# Patient Record
(demographics unavailable — no encounter records)

---

## 2017-07-31 NOTE — EMERGENCY DEPARTMENT REPORT
Entered by BRYAN GIRON, acting as scribe for MARIE MARKHAM NP.


Chief Complaint: Chest Pain


Stated Complaint: CHEST PAIN/NUMBNESS


Time Seen by Provider: 07/31/17 16:11





- HPI


History of Present Illness: 





51 y/o male with high cholesterol, presents with 3/10 left sided chest pain 

that started yesterday and has improved since onset. Pt reports he started new 

medicine yesterday. Additional Sx include HA





- ROS


Review of Systems: 





+HA


+numbness


+chest pain





- Exam


Vital Signs: 


 Vital Signs











  07/31/17





  16:14


 


Temperature 98.7 F


 


Pulse Rate 73


 


Respiratory 18





Rate 


 


Blood Pressure 121/76


 


O2 Sat by Pulse 100





Oximetry 











Physical Exam: 





Cardiovascular: normal rate, normal rhythm, normal heart sounds in s1 and s2. 

No murmurs, rubs or gallops.


MSE screening note: 


Focused history and physical exam performed.


Due to findings the following was ordered:











ED Disposition for MSE


Condition: Stable





This documentation as recorded by the scribe,BRYAN GIRON,accurately reflects 

the service I personally performed and the decisions made by RASHI beckman TRACY M, NP.
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
